# Patient Record
Sex: MALE | Race: WHITE | NOT HISPANIC OR LATINO | URBAN - METROPOLITAN AREA
[De-identification: names, ages, dates, MRNs, and addresses within clinical notes are randomized per-mention and may not be internally consistent; named-entity substitution may affect disease eponyms.]

---

## 2017-09-29 ENCOUNTER — OUTPATIENT (OUTPATIENT)
Dept: OUTPATIENT SERVICES | Facility: HOSPITAL | Age: 32
LOS: 1 days | Discharge: HOME | End: 2017-09-29

## 2017-09-29 DIAGNOSIS — S62.316A DISPLACED FRACTURE OF BASE OF FIFTH METACARPAL BONE, RIGHT HAND, INITIAL ENCOUNTER FOR CLOSED FRACTURE: ICD-10-CM
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
## 2017-09-29 DIAGNOSIS — M65.841 OTHER SYNOVITIS AND TENOSYNOVITIS, RIGHT HAND: ICD-10-CM
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
## 2018-06-28 PROBLEM — Z00.00 ENCOUNTER FOR PREVENTIVE HEALTH EXAMINATION: Status: ACTIVE | Noted: 2018-06-28

## 2018-08-13 ENCOUNTER — APPOINTMENT (OUTPATIENT)
Dept: COLORECTAL SURGERY | Facility: CLINIC | Age: 33
End: 2018-08-13

## 2018-08-21 ENCOUNTER — APPOINTMENT (OUTPATIENT)
Dept: COLORECTAL SURGERY | Facility: CLINIC | Age: 33
End: 2018-08-21

## 2018-08-21 ENCOUNTER — APPOINTMENT (OUTPATIENT)
Dept: COLORECTAL SURGERY | Facility: CLINIC | Age: 33
End: 2018-08-21
Payer: COMMERCIAL

## 2018-08-21 VITALS
TEMPERATURE: 98.3 F | SYSTOLIC BLOOD PRESSURE: 138 MMHG | DIASTOLIC BLOOD PRESSURE: 85 MMHG | HEART RATE: 83 BPM | HEIGHT: 68 IN | BODY MASS INDEX: 25.82 KG/M2 | WEIGHT: 170.38 LBS

## 2018-08-21 PROCEDURE — 46600 DIAGNOSTIC ANOSCOPY SPX: CPT

## 2018-08-21 PROCEDURE — 99203 OFFICE O/P NEW LOW 30 MIN: CPT | Mod: 25

## 2018-09-04 LAB — CORE LAB FLUID CYTOLOGY: NORMAL

## 2019-03-01 ENCOUNTER — APPOINTMENT (OUTPATIENT)
Dept: COLORECTAL SURGERY | Facility: CLINIC | Age: 34
End: 2019-03-01
Payer: COMMERCIAL

## 2019-03-01 VITALS
HEART RATE: 87 BPM | HEIGHT: 68 IN | TEMPERATURE: 98.5 F | WEIGHT: 165.31 LBS | SYSTOLIC BLOOD PRESSURE: 122 MMHG | BODY MASS INDEX: 25.05 KG/M2 | DIASTOLIC BLOOD PRESSURE: 81 MMHG

## 2019-03-01 PROCEDURE — 46600 DIAGNOSTIC ANOSCOPY SPX: CPT

## 2019-03-01 PROCEDURE — 99213 OFFICE O/P EST LOW 20 MIN: CPT | Mod: 25

## 2019-03-01 NOTE — PHYSICAL EXAM
[Wart] : no warts [Normal] : was normal [None] : there was no rectal mass  [de-identified] : Anal pap performed [FreeTextEntry1] : A lighted anoscope was passed into the anal canal and the entire anal mucosal surface was inspected..  THe findings revealed mild/mod internal hemorrhoids. No masses or lesions were identified.\par \par

## 2019-03-01 NOTE — HISTORY OF PRESENT ILLNESS
[FreeTextEntry1] : 34 yo M presents for f/u anal dysplasia and warts\par Last seen 08/2018, here for 6 month follow up\par Denies complaint today\par Pap 08/2018: ASCUS\par \par hx of condyloma approx 5-6  years ago, s/p cryotherapy with dermatologist\par Previous pap 01/2018 ASCUS cannot r/o HGD, hx of AIN II-III in 2016\par HIV (+) Genvoya, undetectable, CD4 unknown\par Completed Gardasil vaccine series\par

## 2019-03-06 LAB — ANAL PAP CYTOLOGY: NORMAL

## 2019-09-06 ENCOUNTER — APPOINTMENT (OUTPATIENT)
Dept: COLORECTAL SURGERY | Facility: CLINIC | Age: 34
End: 2019-09-06

## 2019-10-28 ENCOUNTER — APPOINTMENT (OUTPATIENT)
Dept: COLORECTAL SURGERY | Facility: CLINIC | Age: 34
End: 2019-10-28
Payer: COMMERCIAL

## 2019-10-28 VITALS
BODY MASS INDEX: 26.07 KG/M2 | SYSTOLIC BLOOD PRESSURE: 132 MMHG | HEART RATE: 72 BPM | WEIGHT: 172 LBS | TEMPERATURE: 97.5 F | HEIGHT: 68 IN | DIASTOLIC BLOOD PRESSURE: 86 MMHG

## 2019-10-28 PROCEDURE — 99213 OFFICE O/P EST LOW 20 MIN: CPT | Mod: 25

## 2019-10-28 PROCEDURE — 46600 DIAGNOSTIC ANOSCOPY SPX: CPT

## 2019-10-28 NOTE — ASSESSMENT
[FreeTextEntry1] : I have reviewed with the patient the clinical and natural history of human papilloma virus and its relationship to the anus. The risks and associated consequences including sexual transmission, anal warts, anal dysplasia, and the risk of anal cancer have been outlined. The need for long-term surveillance and followup has been detailed. The treatment options including high resolution anoscopy and its associated risk of recurrence and post procedure stricture and pain compared to continued close surveillance and monitoring were reviewed. The patient wishes to proceed with surveillance and management of identified visible/palpable lesions as identified.\par \par I have personally spent 30 minutes with the patient with greater than 50% of the time counseling cord in the patient's care.\par \par Advised High resolution anoscopy if persistent dysplasia is identified on anal pap,\par

## 2019-10-28 NOTE — PHYSICAL EXAM
[Wart] : no warts [Normal] : was normal [None] : there was no rectal mass  [de-identified] : Anal pap performed [FreeTextEntry1] : A lighted anoscope was passed into the anal canal and the entire anal mucosal surface was inspected..  THe findings revealed moderate internal hemorrhoids. No masses or lesions were identified.\par \par

## 2019-10-28 NOTE — HISTORY OF PRESENT ILLNESS
[FreeTextEntry1] : 35 yo M presents for f/u anal dysplasia and warts\par hx of condyloma approx 5-6 years ago, s/p cryotherapy with dermatologist\par Previous paps ASCUS 8/2018, ASCUS cannot r/o HGD (1/2018), hx of AIN II-III in 2016\par \par Last seen 3/1/19, anal pap LGSIL cannot r/o HGD. Pt presents for routine follow up\par Denies pain, itching, BPR, or lumps/bumps\par BH: daily, denies complaints\par \par HIV (+) on Genvoya, VL undetectable, CD4 unknown\par Completed Gardasil vaccine series

## 2019-11-05 LAB — ANAL PAP CYTOLOGY: NORMAL

## 2021-02-19 ENCOUNTER — APPOINTMENT (OUTPATIENT)
Dept: COLORECTAL SURGERY | Facility: CLINIC | Age: 36
End: 2021-02-19
Payer: COMMERCIAL

## 2021-02-26 ENCOUNTER — APPOINTMENT (OUTPATIENT)
Dept: COLORECTAL SURGERY | Facility: CLINIC | Age: 36
End: 2021-02-26
Payer: COMMERCIAL

## 2021-02-26 VITALS
HEIGHT: 68 IN | WEIGHT: 183 LBS | HEART RATE: 76 BPM | SYSTOLIC BLOOD PRESSURE: 147 MMHG | BODY MASS INDEX: 27.74 KG/M2 | DIASTOLIC BLOOD PRESSURE: 94 MMHG | TEMPERATURE: 97.8 F

## 2021-02-26 DIAGNOSIS — Z87.891 PERSONAL HISTORY OF NICOTINE DEPENDENCE: ICD-10-CM

## 2021-02-26 DIAGNOSIS — B20 HUMAN IMMUNODEFICIENCY VIRUS [HIV] DISEASE: ICD-10-CM

## 2021-02-26 DIAGNOSIS — G43.909 MIGRAINE, UNSPECIFIED, NOT INTRACTABLE, W/OUT STATUS MIGRAINOSUS: ICD-10-CM

## 2021-02-26 PROCEDURE — 46600 DIAGNOSTIC ANOSCOPY SPX: CPT

## 2021-02-26 PROCEDURE — 99072 ADDL SUPL MATRL&STAF TM PHE: CPT

## 2021-02-26 PROCEDURE — 99213 OFFICE O/P EST LOW 20 MIN: CPT | Mod: 25

## 2021-02-26 RX ORDER — DEXTROAMPHETAMINE SULFATE 10 MG/1
TABLET ORAL
Refills: 0 | Status: ACTIVE | COMMUNITY

## 2021-02-26 RX ORDER — BUPROPION HYDROCHLORIDE 450 MG/1
TABLET, FILM COATED, EXTENDED RELEASE ORAL
Refills: 0 | Status: ACTIVE | COMMUNITY

## 2021-02-26 RX ORDER — BICTEGRAVIR SODIUM, EMTRICITABINE, AND TENOFOVIR ALAFENAMIDE FUMARATE 50; 200; 25 MG/1; MG/1; MG/1
50-200-25 TABLET ORAL
Refills: 0 | Status: ACTIVE | COMMUNITY

## 2021-02-26 NOTE — HISTORY OF PRESENT ILLNESS
[FreeTextEntry1] : 34 yo M presents for f/u anal dysplasia\par H/o condyloma managed with cryotherapy by dermatology \par Last seen in the office on 10/28/19, anal pap smear was ASCUS\par \par Pt denies complaints, presents for routine follow up\par \par BH: daily, no complaints\par \par MSM, (+) anal receptive sex \par HIV (+) on Biktarvy, CD4- unknown, VL- undetectable\par Recieved Gardasil vaccine\par Never had a colonoscopy\par Denies ASA/NSAIDs last 7 days

## 2021-02-26 NOTE — PHYSICAL EXAM
[Excoriation] : no perianal excoriation [Wart] : no warts [Normal] : was normal [None] : there was no rectal mass  [de-identified] : Anal pap performed [FreeTextEntry1] : A lighted anoscope was passed into the anal canal and the entire anal mucosal surface was inspected..  The findings revealed moderate internal hemorrhoids. No masses or lesions were identified.\par \par

## 2021-03-03 ENCOUNTER — NON-APPOINTMENT (OUTPATIENT)
Age: 36
End: 2021-03-03

## 2021-03-05 LAB — ANAL PAP CYTOLOGY: NORMAL

## 2022-03-07 ENCOUNTER — NON-APPOINTMENT (OUTPATIENT)
Age: 37
End: 2022-03-07

## 2022-03-11 ENCOUNTER — APPOINTMENT (OUTPATIENT)
Dept: COLORECTAL SURGERY | Facility: CLINIC | Age: 37
End: 2022-03-11
Payer: COMMERCIAL

## 2022-03-11 VITALS
SYSTOLIC BLOOD PRESSURE: 153 MMHG | BODY MASS INDEX: 30.01 KG/M2 | WEIGHT: 198 LBS | HEART RATE: 80 BPM | DIASTOLIC BLOOD PRESSURE: 108 MMHG | HEIGHT: 68 IN | TEMPERATURE: 98 F

## 2022-03-11 PROCEDURE — 46600 DIAGNOSTIC ANOSCOPY SPX: CPT

## 2022-03-11 NOTE — PHYSICAL EXAM
----- Message from Manuel Lutz sent at 4/22/2021 12:49 PM EDT -----  Regarding: Dr. Isidro Susanna: [phone]  General Message/Vendor Calls    Caller's first and last name: N/A      Reason for call: Requesting assistance with getting early appt with [facility] Koko SCALES required yes/no and why: yes/follow up       Best contact number(s):[phone]      Details to clarify the request: N/A      Message from [facility] [Excoriation] : no perianal excoriation [Wart] : no warts [Normal] : was normal [None] : there was no rectal mass  [de-identified] : Anal pap performed [FreeTextEntry1] : Medical assistant was present for the entire exam.\par \par Anoscopy was performed for evaluation of the patients rectal bleeding  history .\par The risks, benefits and alternatives were reviewed.\par \par A lighted anoscope was passed into the anal canal and the entire anal mucosal surface was inspected..  \par The findings revealed moderate internal hemorrhoids.\par No masses or lesions were identified.\par \par

## 2022-03-16 ENCOUNTER — NON-APPOINTMENT (OUTPATIENT)
Age: 37
End: 2022-03-16

## 2022-03-16 LAB — ANAL PAP CYTOLOGY: NORMAL

## 2022-05-11 ENCOUNTER — NON-APPOINTMENT (OUTPATIENT)
Age: 37
End: 2022-05-11

## 2022-06-01 ENCOUNTER — NON-APPOINTMENT (OUTPATIENT)
Age: 37
End: 2022-06-01

## 2022-08-05 ENCOUNTER — NON-APPOINTMENT (OUTPATIENT)
Age: 37
End: 2022-08-05

## 2023-01-06 ENCOUNTER — NON-APPOINTMENT (OUTPATIENT)
Age: 38
End: 2023-01-06

## 2023-03-17 ENCOUNTER — APPOINTMENT (OUTPATIENT)
Dept: COLORECTAL SURGERY | Facility: CLINIC | Age: 38
End: 2023-03-17
Payer: COMMERCIAL

## 2023-04-12 ENCOUNTER — NON-APPOINTMENT (OUTPATIENT)
Age: 38
End: 2023-04-12

## 2023-04-14 ENCOUNTER — NON-APPOINTMENT (OUTPATIENT)
Age: 38
End: 2023-04-14

## 2023-04-14 ENCOUNTER — APPOINTMENT (OUTPATIENT)
Dept: COLORECTAL SURGERY | Facility: CLINIC | Age: 38
End: 2023-04-14
Payer: COMMERCIAL

## 2023-04-14 VITALS
SYSTOLIC BLOOD PRESSURE: 155 MMHG | DIASTOLIC BLOOD PRESSURE: 102 MMHG | WEIGHT: 200 LBS | HEIGHT: 68 IN | TEMPERATURE: 98.1 F | BODY MASS INDEX: 30.31 KG/M2 | HEART RATE: 67 BPM

## 2023-04-14 PROCEDURE — 99213 OFFICE O/P EST LOW 20 MIN: CPT | Mod: 25

## 2023-04-14 PROCEDURE — 46600 DIAGNOSTIC ANOSCOPY SPX: CPT

## 2023-04-14 RX ORDER — TOPIRAMATE 50 MG/1
TABLET, COATED ORAL
Refills: 0 | Status: DISCONTINUED | COMMUNITY
End: 2023-04-14

## 2023-04-14 NOTE — HISTORY OF PRESENT ILLNESS
[FreeTextEntry1] : 36 y/o M presents for evaluation of anal dysplasia \par H/o condyloma, manages w/cryotherapy by dermatology \par \par Seen 2/26/21: Anal pap performed, no fissures, excoriations or warts noted on exam. The sphincter tone was normal. There was nor rectal tenderness present. Moderate internal hemorrhoids, no masses or lesions appreciated on anoscopy. \par \par Anal pap 3/5/21: Final diagnosis: Epithelial cell abnormality. Atypical squamous cells cannot exclude high grade squamous intraepithelial lesion ( ASC-H)\par \par Most recently seen in follow up 3/11/22; anal pap performed. No anal fissures seen. No perianal excoriation and no warts. Sphincter tone was normal. No rectal tenderness present. Anoscopy findings revealed, moderate internal hemorrhoids. \par Anal pap (3/16/22) Low grade squamous intraepithelial lesion (LSIL)\par \par Pt presents for routine follow up\par He denies anorectal complaints, denies anorectal pain or bleeding\par Moving bowels regularly\par Took Alkaseltzer yesterday for upset stomach\par \par MSM (+) anal receptive sex\par HIV (+) on Biktarvy, CD4- unknown, VL- undetectable\par Gardasil vaccine\par Never had a colonoscopy\par \par

## 2023-04-14 NOTE — PHYSICAL EXAM
[Excoriation] : no perianal excoriation [Wart] : no warts [Normal] : was normal [None] : there was no rectal mass  [de-identified] : Anal pap performed [FreeTextEntry1] : Medical assistant was present for the entire exam.\par \par Anoscopy was performed for evaluation of the patients rectal bleeding  history .\par The risks, benefits and alternatives were reviewed.\par \par A lighted anoscope was passed into the anal canal and the entire anal mucosal surface was inspected..  \par The findings revealed moderate internal hemorrhoids.\par No masses or lesions were identified.\par \par

## 2023-05-03 LAB — ANAL PAP CYTOLOGY: NORMAL

## 2023-07-14 ENCOUNTER — APPOINTMENT (OUTPATIENT)
Dept: COLORECTAL SURGERY | Facility: CLINIC | Age: 38
End: 2023-07-14
Payer: COMMERCIAL

## 2023-07-14 VITALS
SYSTOLIC BLOOD PRESSURE: 146 MMHG | TEMPERATURE: 98 F | HEIGHT: 68 IN | DIASTOLIC BLOOD PRESSURE: 98 MMHG | WEIGHT: 181 LBS | BODY MASS INDEX: 27.43 KG/M2 | HEART RATE: 105 BPM

## 2023-07-14 PROCEDURE — 46600 DIAGNOSTIC ANOSCOPY SPX: CPT

## 2023-07-14 PROCEDURE — 99213 OFFICE O/P EST LOW 20 MIN: CPT | Mod: 25

## 2023-07-14 NOTE — ASSESSMENT
[FreeTextEntry1] : I have reviewed with the patient the clinical and natural history of human papilloma virus and its relationship to the anus. The risks and associated consequences including sexual transmission, anal warts, anal dysplasia, and the risk of anal cancer have been outlined. The need for long-term surveillance and followup has been detailed. The treatment options including high resolution anoscopy and its associated risk of recurrence and post procedure stricture and pain compared to continued close surveillance and monitoring were reviewed. The patient wishes to proceed with surveillance and management of identified visible/palpable lesions as identified or high grade ( HGSIL) dysplasia identified on cytology.\par \par \par Advised follow up with GI to determine role interval colonoscopy given history of prior colonoscopy with ? polyps.\par

## 2023-07-14 NOTE — PHYSICAL EXAM
[Excoriation] : no perianal excoriation [Wart] : no warts [Normal] : was normal [None] : there was no rectal mass  [de-identified] : Anal pap performed [FreeTextEntry1] : Medical assistant was present for the entire exam.\par \par Anoscopy was performed for evaluation of the patients rectal bleeding  history .\par The risks, benefits and alternatives were reviewed.\par \par A lighted anoscope was passed into the anal canal and the entire anal mucosal surface was inspected..  \par The findings revealed moderate internal hemorrhoids.\par No masses or lesions were identified.\par \par

## 2023-07-14 NOTE — HISTORY OF PRESENT ILLNESS
[FreeTextEntry1] : 38 y/o M presents for f/u evaluation of anal dysplasia \par H/o condyloma, manages w/cryotherapy by dermatology\par \par MSM (+) anal receptive sex\par HIV (+) on Biktarvy, CD4- unknown, VL- undetectable\par Gardasil vaccine\par Colonoscopy over 10 years ago, benign polyps removed per pt. \par Denies FM[facility] CRC\par \par Seen  2/26/21: Anal pap performed, no fissures, excoriations or warts noted on exam. sphincter tone was normal. There was nor rectal tenderness present. Moderate internal hemorrhoids, no masses or lesions appreciated on anoscopy. \par \par Anal pap 3/5/21: Final diagnosis: Epithelial cell abnormality. Atypical squamous cells cannot exclude high grade squamous intraepithelial lesion ( ASC-H)\par \par Pt seen 3/11/22; anal pap performed. No anal fissures seen. No perianal excoriation and no warts. Sphincter tone was normal. No rectal tenderness present. Anoscopy findings revealed, moderate internal hemorrhoids. \par \par Anal pap (3/16/22) Low grade squamous intraepithelial lesion (LSIL)\par \par Most recent office visit 4/14/23, anal pap performed. No anal fissures. No perianal excoriation, and no warts. Sphincter tone was normal. No rectal tenderness. Moderate internal hemorrhoids on anoscopy noted. \par Anal pap(4/17/23): High grade squamous intraepithelial lesion (HSIL)\par \par Pt presents for 3 month follow up\par Pt denies complaints. Denies pain, irritation, lumps/bumps or bleeding\par Moving bowels reguarly, no complaints\par Took Alkaseltzer a few days ago (contains ASA )\par \par

## 2023-08-09 LAB — ANAL PAP CYTOLOGY: NORMAL

## 2023-09-20 ENCOUNTER — TRANSCRIPTION ENCOUNTER (OUTPATIENT)
Age: 38
End: 2023-09-20

## 2023-09-20 RX ORDER — OXYCODONE HYDROCHLORIDE 5 MG/1
5 TABLET ORAL ONCE
Refills: 0 | Status: DISCONTINUED | OUTPATIENT
Start: 2023-09-21 | End: 2023-09-21

## 2023-09-20 RX ORDER — ACETAMINOPHEN 500 MG
1000 TABLET ORAL ONCE
Refills: 0 | Status: DISCONTINUED | OUTPATIENT
Start: 2023-09-21 | End: 2023-09-21

## 2023-09-20 RX ORDER — SODIUM CHLORIDE 9 MG/ML
1000 INJECTION, SOLUTION INTRAVENOUS
Refills: 0 | Status: DISCONTINUED | OUTPATIENT
Start: 2023-09-21 | End: 2023-09-21

## 2023-09-20 NOTE — ASU PATIENT PROFILE, ADULT - NSICDXPASTSURGICALHX_GEN_ALL_CORE_FT
PAST SURGICAL HISTORY:  H/O eye surgery multiple    H/O oral surgery wisdom teeth    H/O: hammer toe correction bilateral    History of bunionectomy bilateral

## 2023-09-20 NOTE — ASU PATIENT PROFILE, ADULT - FALL HARM RISK - UNIVERSAL INTERVENTIONS
Bed in lowest position, wheels locked, appropriate side rails in place/Call bell, personal items and telephone in reach/Instruct patient to call for assistance before getting out of bed or chair/Non-slip footwear when patient is out of bed/[place] to call system/Physically safe environment - no spills, clutter or unnecessary equipment/Purposeful Proactive Rounding/Room/bathroom lighting operational, light cord in reach

## 2023-09-20 NOTE — ASU PATIENT PROFILE, ADULT - NS PREOP UNDERSTANDS INFO
No solid food/dairy/candy/gum after 9:30pm tonight; water is allowed before 04:30am tomorrow; patient reminded to come with photo ID/insurance/credit card; dress in comfortable clothes; not jewelries/contact lens/valuables allowed; no smoking/alcohol/recreational drug use today; escort to come with photo ID; address and callback number was given./yes

## 2023-09-20 NOTE — ASU PATIENT PROFILE, ADULT - NSICDXPASTMEDICALHX_GEN_ALL_CORE_FT
PAST MEDICAL HISTORY:  Anxiety and depression     HIV disease     HTN (hypertension)     Hypothyroidism

## 2023-09-21 ENCOUNTER — TRANSCRIPTION ENCOUNTER (OUTPATIENT)
Age: 38
End: 2023-09-21

## 2023-09-21 ENCOUNTER — RESULT REVIEW (OUTPATIENT)
Age: 38
End: 2023-09-21

## 2023-09-21 ENCOUNTER — OUTPATIENT (OUTPATIENT)
Dept: OUTPATIENT SERVICES | Facility: HOSPITAL | Age: 38
LOS: 1 days | Discharge: HOME | End: 2023-09-21
Payer: COMMERCIAL

## 2023-09-21 ENCOUNTER — APPOINTMENT (OUTPATIENT)
Dept: COLORECTAL SURGERY | Facility: HOSPITAL | Age: 38
End: 2023-09-21

## 2023-09-21 VITALS
RESPIRATION RATE: 14 BRPM | OXYGEN SATURATION: 100 % | HEART RATE: 69 BPM | DIASTOLIC BLOOD PRESSURE: 93 MMHG | WEIGHT: 166.23 LBS | TEMPERATURE: 97 F | HEIGHT: 68 IN | SYSTOLIC BLOOD PRESSURE: 143 MMHG

## 2023-09-21 VITALS
OXYGEN SATURATION: 98 % | DIASTOLIC BLOOD PRESSURE: 77 MMHG | SYSTOLIC BLOOD PRESSURE: 130 MMHG | TEMPERATURE: 99 F | HEART RATE: 78 BPM | RESPIRATION RATE: 12 BRPM

## 2023-09-21 DIAGNOSIS — Z98.890 OTHER SPECIFIED POSTPROCEDURAL STATES: Chronic | ICD-10-CM

## 2023-09-21 PROCEDURE — 46607 DIAGNOSTIC ANOSCOPY & BIOPSY: CPT | Mod: GC

## 2023-09-21 PROCEDURE — 46924 DESTRUCTION ANAL LESION(S): CPT | Mod: GC

## 2023-09-21 PROCEDURE — 88305 TISSUE EXAM BY PATHOLOGIST: CPT | Mod: 26

## 2023-09-21 RX ORDER — BICTEGRAVIR SODIUM, EMTRICITABINE, AND TENOFOVIR ALAFENAMIDE FUMARATE 30; 120; 15 MG/1; MG/1; MG/1
1 TABLET ORAL
Refills: 0 | DISCHARGE

## 2023-09-21 RX ORDER — OXYCODONE HYDROCHLORIDE 5 MG/1
1 TABLET ORAL
Qty: 12 | Refills: 0
Start: 2023-09-21

## 2023-09-21 RX ORDER — DOCUSATE SODIUM 100 MG
1 CAPSULE ORAL
Qty: 10 | Refills: 0
Start: 2023-09-21

## 2023-09-21 RX ORDER — LEVOTHYROXINE SODIUM 125 MCG
1 TABLET ORAL
Refills: 0 | DISCHARGE

## 2023-09-21 RX ORDER — FENTANYL CITRATE 50 UG/ML
50 INJECTION INTRAVENOUS
Refills: 0 | Status: COMPLETED | OUTPATIENT
Start: 2023-09-21 | End: 2023-09-21

## 2023-09-21 RX ORDER — FENTANYL CITRATE 50 UG/ML
50 INJECTION INTRAVENOUS ONCE
Refills: 0 | Status: DISCONTINUED | OUTPATIENT
Start: 2023-09-21 | End: 2023-09-21

## 2023-09-21 RX ORDER — TELMISARTAN 20 MG/1
1 TABLET ORAL
Refills: 0 | DISCHARGE

## 2023-09-21 RX ORDER — BUPROPION HYDROCHLORIDE 150 MG/1
1 TABLET, EXTENDED RELEASE ORAL
Refills: 0 | DISCHARGE

## 2023-09-21 RX ORDER — SUMATRIPTAN SUCCINATE 4 MG/.5ML
1 INJECTION, SOLUTION SUBCUTANEOUS
Refills: 0 | DISCHARGE

## 2023-09-21 RX ADMIN — FENTANYL CITRATE 50 MICROGRAM(S): 50 INJECTION INTRAVENOUS at 09:05

## 2023-09-21 RX ADMIN — FENTANYL CITRATE 50 MICROGRAM(S): 50 INJECTION INTRAVENOUS at 08:50

## 2023-09-21 RX ADMIN — SODIUM CHLORIDE 100 MILLILITER(S): 9 INJECTION, SOLUTION INTRAVENOUS at 09:01

## 2023-09-21 RX ADMIN — FENTANYL CITRATE 50 MICROGRAM(S): 50 INJECTION INTRAVENOUS at 08:30

## 2023-09-21 NOTE — ASU DISCHARGE PLAN (ADULT/PEDIATRIC) - CARE PROVIDER_API CALL
Garrett Newell  Surgery  [facility]  [place] [address]  Phone: [phone]  Fax: [phone]  Follow Up Time: 1 week

## 2023-09-21 NOTE — ASU DISCHARGE PLAN (ADULT/PEDIATRIC) - DRIVING DURATION DAY(S)
Longstanding, stable. Not interested in eye muscle surgery. 1 day, and while taking Narcotic pain medications

## 2023-09-21 NOTE — ASU DISCHARGE PLAN (ADULT/PEDIATRIC) - MEDICATION INSTRUCTIONS
Tylenol 650 mg every 6 hours as needed for moderate pain. Take the Oxycodone for severe/breakthrough pain

## 2023-09-21 NOTE — BRIEF OPERATIVE NOTE - OPERATION/FINDINGS
Patient placed in prone position after induction of adequate GA and intubation, local anesthesia instilled. On visual inspection no concerning lesions identified. Acetic acid soaked guaze placed followed by painting the anal canal with Lugol's solution. Concerning lesion biopsied using biopsy forcep and sent as specimen. Hemostasis confirmed. Dry dressing placed.

## 2023-09-26 LAB — SURGICAL PATHOLOGY STUDY: SIGNIFICANT CHANGE UP

## 2023-10-19 PROBLEM — I10 ESSENTIAL (PRIMARY) HYPERTENSION: Chronic | Status: ACTIVE | Noted: 2023-09-20

## 2023-10-19 PROBLEM — F41.9 ANXIETY DISORDER, UNSPECIFIED: Chronic | Status: ACTIVE | Noted: 2023-09-20

## 2023-10-19 PROBLEM — B20 HUMAN IMMUNODEFICIENCY VIRUS [HIV] DISEASE: Chronic | Status: ACTIVE | Noted: 2023-09-20

## 2023-10-19 PROBLEM — E03.9 HYPOTHYROIDISM, UNSPECIFIED: Chronic | Status: ACTIVE | Noted: 2023-09-20

## 2024-01-01 ENCOUNTER — NON-APPOINTMENT (OUTPATIENT)
Age: 39
End: 2024-01-01

## 2024-01-12 ENCOUNTER — APPOINTMENT (OUTPATIENT)
Dept: COLORECTAL SURGERY | Facility: CLINIC | Age: 39
End: 2024-01-12
Payer: COMMERCIAL

## 2024-01-12 VITALS
SYSTOLIC BLOOD PRESSURE: 152 MMHG | HEIGHT: 68 IN | HEART RATE: 73 BPM | DIASTOLIC BLOOD PRESSURE: 92 MMHG | TEMPERATURE: 96.8 F | WEIGHT: 167 LBS | BODY MASS INDEX: 25.31 KG/M2

## 2024-01-12 DIAGNOSIS — K62.82 DYSPLASIA OF ANUS: ICD-10-CM

## 2024-01-12 PROCEDURE — 46600 DIAGNOSTIC ANOSCOPY SPX: CPT

## 2024-01-12 NOTE — PHYSICAL EXAM
[Excoriation] : no perianal excoriation [Wart] : no warts [Normal] : was normal [None] : there was no rectal mass  [de-identified] : Anal pap performed [FreeTextEntry1] : Medical assistant was present for the entire exam.\par  \par  Anoscopy was performed for evaluation of the patients rectal bleeding  history .\par  The risks, benefits and alternatives were reviewed.\par  \par  A lighted anoscope was passed into the anal canal and the entire anal mucosal surface was inspected..  \par  The findings revealed moderate internal hemorrhoids.\par  No masses or lesions were identified.\par  \par

## 2024-01-12 NOTE — HISTORY OF PRESENT ILLNESS
[FreeTextEntry1] : 37 y/o M presents for f/u s/p HRA 09/21/23  H/o condyloma, manages w/cryotherapy by dermatology MSM (+) anal receptive sex HIV (+) contines on Biktarvy, last saw PCP in 10/2023, VL stable as per patient.  Gardasil vaccine  Denies H CRC Colonoscopy over 10 years ago, benign polyps removed per pt.  Seen 2/26/21: Anal pap: Atypical squamous cells.  Cannot exclude high grade squamous intraepithelial lesion (ASC-H)   Pt seen 3/11/22; anal pap performed:  Low grade squamous intraepithelial lesion (LSIL)  Seen 4/14/23, anal pap performed. No anal fissures. No perianal excoriation, and no warts.  Anal pap: High grade squamous intraepithelial lesion    Last seen 07/14/2023, Anal Pap: Low grade squamous intraepithelial lesion, a higher grade lesion cannot be excluded.  S/p High-resolution anoscopy, destruction of anal lesion 9/21/23  Pathology:  1. Anal canal, right lateral; biopsy: High-grade squamous intraepithelial lesion (HSIL / AIN3). 2. Anal canal, left lateral; biopsy: Squamous mucosa negative for dysplasia. 3. Anal canal, posterior; biopsy: High-grade squamous intraepithelial lesion (HSIL / AIN2).  Patient presents for routine f/u, denies any complaints today.

## 2024-02-01 ENCOUNTER — NON-APPOINTMENT (OUTPATIENT)
Age: 39
End: 2024-02-01

## 2024-02-01 LAB — ANAL PAP CYTOLOGY: NORMAL

## 2024-04-23 NOTE — HISTORY OF PRESENT ILLNESS
[FreeTextEntry1] : 37 y/o M presents for f/u anal dysplasia \par H/o condyloma manages w/cryotherapy by dermatology\par No complaints at this time.  No pain or rectal bleeding.\par \par \par \par Last seen in the office 2/26/21: Anal pap performed, no fissures, excoriations or warts noted on exam. The sphincter tone was normal. There was nor rectal tenderness present. Moderate internal hemorrhoids, no masses or lesions appreciated on anoscopy.  HPV treatment and surveillance reviewed. Unable to exclude high grade squamous intraepithelial lesion (ASC-H) pt recommended for yearly surveillance. \par \par Anal pap 3/5/21: \par Final diagnosis: \par Epithelial cell abnormality. \par Atypical squamous cells cannot exclude high grade squamous intraepithelial lesion ( ASC-H)\par \par \par \par MSM, (+) anal receptive sex \par HIV (+) on Biktarvy, CD4- unknown, VL- undetectable\par Gardasil vaccine\par Never had a colonoscopy\par Denies ASA/NSAIDs last 7 days
Yes...

## 2024-05-11 ENCOUNTER — NON-APPOINTMENT (OUTPATIENT)
Age: 39
End: 2024-05-11

## 2024-11-08 ENCOUNTER — APPOINTMENT (OUTPATIENT)
Dept: COLORECTAL SURGERY | Facility: CLINIC | Age: 39
End: 2024-11-08

## 2024-11-08 VITALS
DIASTOLIC BLOOD PRESSURE: 94 MMHG | HEART RATE: 92 BPM | BODY MASS INDEX: 24.25 KG/M2 | HEIGHT: 68 IN | WEIGHT: 160 LBS | SYSTOLIC BLOOD PRESSURE: 151 MMHG | TEMPERATURE: 96.8 F

## 2024-11-08 DIAGNOSIS — K62.82 DYSPLASIA OF ANUS: ICD-10-CM

## 2024-11-08 PROCEDURE — 46600 DIAGNOSTIC ANOSCOPY SPX: CPT

## 2024-11-12 LAB — ANAL PAP CYTOLOGY: NORMAL

## 2024-12-05 ENCOUNTER — NON-APPOINTMENT (OUTPATIENT)
Age: 39
End: 2024-12-05

## 2025-01-16 ENCOUNTER — APPOINTMENT (OUTPATIENT)
Dept: INTERNAL MEDICINE | Facility: CLINIC | Age: 40
End: 2025-01-16

## 2025-02-06 ENCOUNTER — APPOINTMENT (OUTPATIENT)
Dept: COLORECTAL SURGERY | Facility: HOSPITAL | Age: 40
End: 2025-02-06

## (undated) DEVICE — PACK COLO RECTAL

## (undated) DEVICE — SYR ASEPTO

## (undated) DEVICE — DRAPE TOWEL BLUE 17" X 24"

## (undated) DEVICE — TAPE SILK 3"

## (undated) DEVICE — GLV 8 PROTEXIS (WHITE)

## (undated) DEVICE — VENODYNE/SCD SLEEVE CALF MEDIUM

## (undated) DEVICE — DRSG TEGADERM 4X4.75

## (undated) DEVICE — NDL HYPO SAFE 22G X 1.5" (BLACK)

## (undated) DEVICE — POSITIONER STRAP KNEE & BODY 3X60" DISP

## (undated) DEVICE — WARMING BLANKET UPPER ADULT

## (undated) DEVICE — SPECIMEN CONTAINER 4OZ

## (undated) DEVICE — SYR LUER LOK 10CC

## (undated) DEVICE — ELCTR STRYKER EXTENSION SUCTION TIP 125MM

## (undated) DEVICE — GLV 7.5 PROTEXIS (WHITE)

## (undated) DEVICE — SUT VICRYL 2-0 27" CT-1

## (undated) DEVICE — ELCTR STRYKER BLADE COATED 125MM

## (undated) DEVICE — DRSG CURITY GAUZE SPONGE 4 X 8" 12-PLY NON-STERILE

## (undated) DEVICE — POSITIONER FOAM EGG CRATE ULNAR 2PCS (PINK)

## (undated) DEVICE — ELCTR STRYKER EXTENSION SUCTION TIP 165MM